# Patient Record
Sex: FEMALE | Race: WHITE | NOT HISPANIC OR LATINO | Employment: FULL TIME | URBAN - METROPOLITAN AREA
[De-identification: names, ages, dates, MRNs, and addresses within clinical notes are randomized per-mention and may not be internally consistent; named-entity substitution may affect disease eponyms.]

---

## 2020-06-18 ENCOUNTER — OFFICE VISIT (OUTPATIENT)
Dept: OBGYN | Facility: CLINIC | Age: 25
End: 2020-06-18
Payer: COMMERCIAL

## 2020-06-18 VITALS — DIASTOLIC BLOOD PRESSURE: 80 MMHG | SYSTOLIC BLOOD PRESSURE: 132 MMHG | HEART RATE: 114 BPM | WEIGHT: 156.7 LBS

## 2020-06-18 DIAGNOSIS — Z30.432 ENCOUNTER FOR IUD REMOVAL: ICD-10-CM

## 2020-06-18 DIAGNOSIS — Z30.433 ENCOUNTER FOR REMOVAL AND REINSERTION OF INTRAUTERINE CONTRACEPTIVE DEVICE (IUD): ICD-10-CM

## 2020-06-18 DIAGNOSIS — Z30.430 ENCOUNTER FOR IUD INSERTION: ICD-10-CM

## 2020-06-18 DIAGNOSIS — Z30.433 ENCOUNTER FOR REMOVAL AND REINSERTION OF INTRAUTERINE CONTRACEPTIVE DEVICE: Primary | ICD-10-CM

## 2020-06-18 LAB — HCG, QUAL URINE: NORMAL

## 2020-06-18 PROCEDURE — 84703 CHORIONIC GONADOTROPIN ASSAY: CPT | Performed by: OBSTETRICS & GYNECOLOGY

## 2020-06-18 PROCEDURE — 58300 INSERT INTRAUTERINE DEVICE: CPT | Performed by: OBSTETRICS & GYNECOLOGY

## 2020-06-18 PROCEDURE — 58301 REMOVE INTRAUTERINE DEVICE: CPT | Performed by: OBSTETRICS & GYNECOLOGY

## 2020-06-18 RX ORDER — SPIRONOLACTONE 100 MG/1
TABLET, FILM COATED ORAL
COMMUNITY
Start: 2020-04-03 | End: 2022-05-09

## 2020-06-18 NOTE — NURSING NOTE
Chief Complaint   Patient presents with     IUD     Remove and insert Salud       Initial /80 (BP Location: Right arm, Patient Position: Chair, Cuff Size: Adult Regular)   Pulse 114   Wt 71.1 kg (156 lb 11.2 oz)   LMP 2020 (Approximate)   Breastfeeding No  There is no height or weight on file to calculate BMI.  BP completed using cuff size: regular    Questioned patient about current smoking habits.  Pt. has never smoked.          The following HM Due: NONE      Pap Dec 2019 = NIL       Marla Gutierrez CMA on 2020 at 3:26 PM

## 2020-06-18 NOTE — PROGRESS NOTES
SUBJECTIVE:    Is a pregnancy test required: Yes.  Was it positive or negative?  Negative  Was a consent obtained?  Yes    Subjective: Rafael Loya is a 24 year old  presents for IUD and desires Salud  type IUD.  She requests removal of the IUD because the IUD effectiveness has  She had her previous Salud IUD inserted in May 2017    Patient has been given the opportunity to ask questions about all forms of birth control, including all options appropriate for Rafael Loya. Discussed that no method of birth control, except abstinence is 100% effective against pregnancy or sexually transmitted infection.     Rafael Loya understands she may have the IUD removed at any time. IUD should be removed by a health care provider and the current IUD will be removed today.    The entire removal and insertion procedure was reviewed with the patient, including care after placement.    Today's PHQ-2 Score: No flowsheet data found.    PROCEDURE:    Premedicated with ibuprofen.  A speculum exam was performed and the cervix was visualized. The IUD string was visualized. Using ring forceps, the string  was grasped and the IUD removed intact.    Under sterile technique, cervix was visualized with speculum and prepped with Betadine solution swab x 3. Tenaculum was placed for stability. The uterus was gently straightened and sounded to 8.0 cm. IUD prepared for placement, and IUD inserted according to 's instructions without difficulty or significant ressitance, and deployed at the fundus. The strings were visualized and trimmed to 2.0 cm from the external os. Tenaculum was removed and hemostasis noted. Speculum removed.  Patient tolerated procedure well.    EBL: minimal    Complications: none      POST PROCEDURE:    Given 's handouts, including when to have IUD removed, list of danger s/sx, side effects and follow up recommended. Encouraged condom use for prevention of STD. Advised  to call for any fever, for prolonged or severe pain or bleeding, abnormal vaginal dischage, or unable to palpate strings. She was advised to use pain medications (ibuprofen) as needed for mild to moderate pain. Advised to follow-up in clinic in 4-6 weeks for IUD string check if unable to find strings or as directed by provider.     Vijaya Godoy MD  [facility]

## 2020-11-22 ENCOUNTER — HEALTH MAINTENANCE LETTER (OUTPATIENT)
Age: 25
End: 2020-11-22

## 2021-09-19 ENCOUNTER — HEALTH MAINTENANCE LETTER (OUTPATIENT)
Age: 26
End: 2021-09-19

## 2022-01-09 ENCOUNTER — HEALTH MAINTENANCE LETTER (OUTPATIENT)
Age: 27
End: 2022-01-09

## 2022-05-03 ASSESSMENT — ENCOUNTER SYMPTOMS
SORE THROAT: 0
FEVER: 0
JOINT SWELLING: 0
MYALGIAS: 0
HEMATOCHEZIA: 0
DYSURIA: 0
ARTHRALGIAS: 0
PARESTHESIAS: 0
WEAKNESS: 0
HEMATURIA: 0
SHORTNESS OF BREATH: 0
FREQUENCY: 0
CONSTIPATION: 0
PALPITATIONS: 0
NERVOUS/ANXIOUS: 1
EYE PAIN: 0
DIZZINESS: 0
HEADACHES: 0
NAUSEA: 0
COUGH: 0
CHILLS: 0
HEARTBURN: 0
BREAST MASS: 0
ABDOMINAL PAIN: 0
DIARRHEA: 0

## 2022-05-09 ENCOUNTER — OFFICE VISIT (OUTPATIENT)
Dept: FAMILY MEDICINE | Facility: CLINIC | Age: 27
End: 2022-05-09
Payer: COMMERCIAL

## 2022-05-09 VITALS
OXYGEN SATURATION: 100 % | WEIGHT: 187 LBS | DIASTOLIC BLOOD PRESSURE: 64 MMHG | HEART RATE: 132 BPM | TEMPERATURE: 97.5 F | SYSTOLIC BLOOD PRESSURE: 138 MMHG

## 2022-05-09 DIAGNOSIS — Z00.00 ROUTINE PHYSICAL EXAMINATION: Primary | ICD-10-CM

## 2022-05-09 DIAGNOSIS — Z12.4 CERVICAL CANCER SCREENING: ICD-10-CM

## 2022-05-09 PROBLEM — F32.A DEPRESSIVE DISORDER: Status: ACTIVE | Noted: 2022-05-09

## 2022-05-09 PROBLEM — I10 HYPERTENSION: Status: ACTIVE | Noted: 2022-05-09

## 2022-05-09 PROCEDURE — 99385 PREV VISIT NEW AGE 18-39: CPT | Performed by: NURSE PRACTITIONER

## 2022-05-09 PROCEDURE — G0145 SCR C/V CYTO,THINLAYER,RESCR: HCPCS | Performed by: NURSE PRACTITIONER

## 2022-05-09 ASSESSMENT — ENCOUNTER SYMPTOMS
PARESTHESIAS: 0
MYALGIAS: 0
WEAKNESS: 0
ARTHRALGIAS: 0
EYE PAIN: 0
ABDOMINAL PAIN: 0
HEADACHES: 0
SORE THROAT: 0
JOINT SWELLING: 0
BREAST MASS: 0
DYSURIA: 0
FREQUENCY: 0
CHILLS: 0
CONSTIPATION: 0
HEARTBURN: 0
COUGH: 0
DIZZINESS: 0
DIARRHEA: 0
SHORTNESS OF BREATH: 0
NERVOUS/ANXIOUS: 1
HEMATURIA: 0
FEVER: 0
NAUSEA: 0
HEMATOCHEZIA: 0
PALPITATIONS: 0

## 2022-05-09 NOTE — PROGRESS NOTES
SUBJECTIVE:   CC: Rafael Loya is an 26 year old woman who presents for preventive health visit.        Healthy Habits:     Getting at least 3 servings of Calcium per day:  Yes    Bi-annual eye exam:  NO    Dental care twice a year:  NO    Sleep apnea or symptoms of sleep apnea:  Daytime drowsiness    Diet:  Regular (no restrictions) and Breakfast skipped    Frequency of exercise:  2-3 days/week    Duration of exercise:  15-30 minutes    Taking medications regularly:  Yes    Medication side effects:  Not applicable    PHQ-2 Total Score: 2     -Skin area on her back - not painful - not red. Noticed in January. Seemed like a pimple that might come to the surface but never did.     Today's PHQ-2 Score:   PHQ-2 ( 1999 Pfizer) 5/3/2022   Q1: Little interest or pleasure in doing things 1   Q2: Feeling down, depressed or hopeless 1   PHQ-2 Score 2   Q1: Little interest or pleasure in doing things Several days   Q2: Feeling down, depressed or hopeless Several days   PHQ-2 Score 2       Abuse: Current or Past (Physical, Sexual or Emotional) - No  Do you feel safe in your environment? Yes    Have you ever done Advance Care Planning? (For example, a Health Directive, POLST, or a discussion with a medical provider or your loved ones about your wishes):     Social History     Tobacco Use     Smoking status: Never Smoker     Smokeless tobacco: Never Used   Substance Use Topics     Alcohol use: Yes     Comment: socailly       Alcohol Use 5/3/2022   Prescreen: >3 drinks/day or >7 drinks/week? No       Reviewed orders with patient.  Reviewed health maintenance and updated orders accordingly -   Lab work is in process    Breast Cancer Screening:    Breast CA Risk Assessment (FHS-7) 5/3/2022   Do you have a family history of breast, colon, or ovarian cancer? No / Unknown         Patient under 40 years of age: Routine Mammogram Screening not recommended.   Pertinent mammograms are reviewed under the imaging tab.    History of  abnormal Pap smear: NO - age 21-29 PAP every 3 years recommended     Reviewed and updated as needed this visit by clinical staff   Tobacco  Allergies  Meds   Med Hx   Fam Hx            Reviewed and updated as needed this visit by Provider       Med Hx   Fam Hx           Past Medical History:   Diagnosis Date     Depressive disorder August 2018    Mild depression/moderate anxiety. Took sertraline until about a year ago     Hypertension     I have measured high a few times, nothing diagnosed but have family history of high blood pressure      Past Surgical History:   Procedure Laterality Date     ORTHOPEDIC SURGERY      Broken arm age 14       Review of Systems   Constitutional: Negative for chills and fever.   HENT: Negative for congestion, ear pain, hearing loss and sore throat.    Eyes: Negative for pain and visual disturbance.   Respiratory: Negative for cough and shortness of breath.    Cardiovascular: Negative for chest pain, palpitations and peripheral edema.   Gastrointestinal: Negative for abdominal pain, constipation, diarrhea, heartburn, hematochezia and nausea.   Breasts:  Negative for tenderness, breast mass and discharge.   Genitourinary: Negative for dysuria, frequency, genital sores, hematuria, pelvic pain, urgency, vaginal bleeding and vaginal discharge.   Musculoskeletal: Negative for arthralgias, joint swelling and myalgias.   Skin: Negative for rash.   Neurological: Negative for dizziness, weakness, headaches and paresthesias.   Psychiatric/Behavioral: Negative for mood changes. The patient is nervous/anxious.              OBJECTIVE:   /64 (BP Location: Left arm, Cuff Size: Adult Regular)   Pulse (!) 132   Temp 97.5  F (36.4  C) (Tympanic)   Wt 84.8 kg (187 lb)   SpO2 100%   Physical Exam  GENERAL: healthy, alert and no distress  EYES: Eyes grossly normal to inspection, PERRL and conjunctivae and sclerae normal  HENT: ear canals and TM's normal, nose and mouth without ulcers or  lesions  NECK: no adenopathy, no asymmetry, masses, or scars and thyroid normal to palpation  RESP: lungs clear to auscultation - no rales, rhonchi or wheezes  BREAST: normal without masses, tenderness or nipple discharge and no palpable axillary masses or adenopathy  CV: regular rate and rhythm, normal S1 S2, no S3 or S4, no murmur, click or rub, no peripheral edema and peripheral pulses strong  ABDOMEN: soft, nontender, no hepatosplenomegaly, no masses and bowel sounds normal   (female): normal female external genitalia, normal urethral meatus, vaginal mucosa pink, moist, well rugated, and normal cervix/adnexa/uterus without masses or discharge  MS: no gross musculoskeletal defects noted, no edema  SKIN:small 1 cm cyst mid upper back, nontender.  NEURO: Normal strength and tone, mentation intact and speech normal  PSYCH: mentation appears normal, affect normal/bright    Diagnostic Test Results:  Labs reviewed in Epic    ASSESSMENT/PLAN:   Rafael was seen today for physical.    Diagnoses and all orders for this visit:    Routine physical examination    Cervical cancer screening  -     Pap Screen reflex to HPV if ASCUS - recommend age 25 - 29    Other orders  -     REVIEW OF HEALTH MAINTENANCE PROTOCOL ORDERS            COUNSELING:  Reviewed preventive health counseling, as reflected in patient instructions    There is no height or weight on file to calculate BMI.    Weight management plan: Discussed healthy diet and exercise guidelines    She reports that she has never smoked. She has never used smokeless tobacco.      Counseling Resources:  ATP IV Guidelines  Pooled Cohorts Equation Calculator  Breast Cancer Risk Calculator  BRCA-Related Cancer Risk Assessment: FHS-7 Tool  FRAX Risk Assessment  ICSI Preventive Guidelines  Dietary Guidelines for Americans, 2010  USDA's MyPlate  ASA Prophylaxis  Lung CA Screening    Whitney Florentino, [facility]

## 2022-05-13 LAB
BKR LAB AP GYN ADEQUACY: NORMAL
BKR LAB AP GYN INTERPRETATION: NORMAL
BKR LAB AP HPV REFLEX: NORMAL
BKR LAB AP PREVIOUS ABNORMAL: NORMAL
PATH REPORT.COMMENTS IMP SPEC: NORMAL
PATH REPORT.COMMENTS IMP SPEC: NORMAL
PATH REPORT.RELEVANT HX SPEC: NORMAL

## 2022-11-21 ENCOUNTER — HEALTH MAINTENANCE LETTER (OUTPATIENT)
Age: 27
End: 2022-11-21

## 2023-07-08 ENCOUNTER — HEALTH MAINTENANCE LETTER (OUTPATIENT)
Age: 28
End: 2023-07-08

## 2023-08-13 NOTE — PROGRESS NOTES
SUBJECTIVE:    Is a pregnancy test required: No.  Was a consent obtained?  Yes    Subjective: Rafael Loya is a 27 year old  presents for IUD and desires hormone type IUD.  She requests removal of the IUD because the IUD effectiveness has     Patient has been given the opportunity to ask questions about all forms of birth control, including all options appropriate for Rafael Loya. Discussed that no method of birth control, except abstinence is 100% effective against pregnancy or sexually transmitted infection.     Rafael Loya understands she may have the IUD removed at any time. IUD should be removed by a health care provider and the current IUD will be removed today.    The entire removal and insertion procedure was reviewed with the patient, including care after placement.    Today's PHQ-2 Score:       5/3/2022    12:36 PM   PHQ-2 (  [employer])   Q1: Little interest or pleasure in doing things 1   Q2: Feeling down, depressed or hopeless 1   PHQ-2 Score 2   Q1: Little interest or pleasure in doing things Several days   Q2: Feeling down, depressed or hopeless Several days   PHQ-2 Score 2       PROCEDURE:    Premedicated with ibuprofen.  A speculum exam was performed and the cervix was visualized. The IUD string was visualized. Using ring forceps, the string  was grasped and the IUD removed intact.    Under sterile technique, cervix was visualized with speculum and prepped with Betadine solution swab x 3. The uterus was gently straightened and sounded to 8.0 cm. IUD prepared for placement, and IUD inserted according to 's instructions without difficulty or significant ressitance, and deployed at the fundus. The strings were visualized and trimmed to 3.0 cm from the external os. Tenaculum was removed and hemostasis noted. Speculum removed.  Patient tolerated procedure well.    Lot # [id number]  Exp: 2024    EBL: minimal    Complications: none      POST  PROCEDURE:    Given 's handouts, including when to have IUD removed, list of danger s/sx, side effects and follow up recommended. Encouraged condom use for prevention of STD. Advised to call for any fever, for prolonged or severe pain or bleeding, abnormal vaginal dischage, or unable to palpate strings. She was advised to use pain medications (ibuprofen) as needed for mild to moderate pain. Advised to follow-up in clinic in 4-6 weeks for IUD string check if unable to find strings or as directed by provider.     ELIAZAR Hernandez CNM

## 2023-08-14 ENCOUNTER — OFFICE VISIT (OUTPATIENT)
Dept: MIDWIFE SERVICES | Facility: CLINIC | Age: 28
End: 2023-08-14
Payer: COMMERCIAL

## 2023-08-14 VITALS
WEIGHT: 179 LBS | DIASTOLIC BLOOD PRESSURE: 60 MMHG | SYSTOLIC BLOOD PRESSURE: 125 MMHG | BODY MASS INDEX: 28.77 KG/M2 | HEIGHT: 66 IN

## 2023-08-14 DIAGNOSIS — Z30.433 ENCOUNTER FOR REMOVAL AND REINSERTION OF INTRAUTERINE CONTRACEPTIVE DEVICE: Primary | ICD-10-CM

## 2023-08-14 PROCEDURE — 58301 REMOVE INTRAUTERINE DEVICE: CPT | Performed by: ADVANCED PRACTICE MIDWIFE

## 2023-08-14 PROCEDURE — 58300 INSERT INTRAUTERINE DEVICE: CPT | Performed by: ADVANCED PRACTICE MIDWIFE

## 2023-08-14 ASSESSMENT — ANXIETY QUESTIONNAIRES
6. BECOMING EASILY ANNOYED OR IRRITABLE: SEVERAL DAYS
IF YOU CHECKED OFF ANY PROBLEMS ON THIS QUESTIONNAIRE, HOW DIFFICULT HAVE THESE PROBLEMS MADE IT FOR YOU TO DO YOUR WORK, TAKE CARE OF THINGS AT HOME, OR GET ALONG WITH OTHER PEOPLE: SOMEWHAT DIFFICULT
5. BEING SO RESTLESS THAT IT IS HARD TO SIT STILL: NOT AT ALL
3. WORRYING TOO MUCH ABOUT DIFFERENT THINGS: SEVERAL DAYS
1. FEELING NERVOUS, ANXIOUS, OR ON EDGE: SEVERAL DAYS
7. FEELING AFRAID AS IF SOMETHING AWFUL MIGHT HAPPEN: SEVERAL DAYS
2. NOT BEING ABLE TO STOP OR CONTROL WORRYING: SEVERAL DAYS
GAD7 TOTAL SCORE: 6
GAD7 TOTAL SCORE: 6

## 2023-08-14 ASSESSMENT — PATIENT HEALTH QUESTIONNAIRE - PHQ9
5. POOR APPETITE OR OVEREATING: SEVERAL DAYS
SUM OF ALL RESPONSES TO PHQ QUESTIONS 1-9: 2

## 2024-08-31 ENCOUNTER — HEALTH MAINTENANCE LETTER (OUTPATIENT)
Age: 29
End: 2024-08-31

## 2025-04-22 PROBLEM — K80.20 GALLSTONES: Status: ACTIVE | Noted: 2023-01-15

## 2025-04-22 PROBLEM — Z00.00 ENCOUNTER FOR PREVENTIVE HEALTH EXAMINATION: Status: ACTIVE | Noted: 2025-04-22

## 2025-04-22 PROBLEM — L70.8 OTHER ACNE: Status: ACTIVE | Noted: 2019-12-02

## 2025-04-22 PROBLEM — F41.9 ANXIETY: Status: ACTIVE | Noted: 2019-12-02

## 2025-04-22 PROBLEM — I34.1 MITRAL PROLAPSE: Status: ACTIVE | Noted: 2022-12-06

## 2025-04-23 ENCOUNTER — OFFICE VISIT (OUTPATIENT)
Dept: OBGYN | Facility: CLINIC | Age: 30
End: 2025-04-23
Attending: ADVANCED PRACTICE MIDWIFE
Payer: COMMERCIAL

## 2025-04-23 VITALS
BODY MASS INDEX: 30.81 KG/M2 | WEIGHT: 190.9 LBS | SYSTOLIC BLOOD PRESSURE: 120 MMHG | DIASTOLIC BLOOD PRESSURE: 82 MMHG | HEART RATE: 102 BPM

## 2025-04-23 DIAGNOSIS — I10 PRIMARY HYPERTENSION: ICD-10-CM

## 2025-04-23 DIAGNOSIS — Z00.00 ENCOUNTER FOR PREVENTIVE HEALTH EXAMINATION: Primary | ICD-10-CM

## 2025-04-23 DIAGNOSIS — R03.0 ELEVATED BLOOD PRESSURE READING IN OFFICE WITHOUT DIAGNOSIS OF HYPERTENSION: ICD-10-CM

## 2025-04-23 DIAGNOSIS — F41.8 DEPRESSION WITH ANXIETY: ICD-10-CM

## 2025-04-23 DIAGNOSIS — Z30.432 ENCOUNTER FOR IUD REMOVAL: ICD-10-CM

## 2025-04-23 PROCEDURE — 87624 HPV HI-RISK TYP POOLED RSLT: CPT | Performed by: ADVANCED PRACTICE MIDWIFE

## 2025-04-23 RX ORDER — CYANOCOBALAMIN (VITAMIN B-12) 500 MCG
1 TABLET ORAL DAILY
Qty: 90 CAPSULE | Refills: 3 | Status: SHIPPED | OUTPATIENT
Start: 2025-04-23

## 2025-04-23 NOTE — PATIENT INSTRUCTIONS
Thank you for trusting us with your care!   Please be aware, if you are on Mychart, you may see your results prior to your providers review. If labs are abnormal, we will call or message you on [employer] with a follow up plan.    If you need to contact us for questions about:  Symptoms, Scheduling & Medical Questions; Non-urgent (2-3 day response) [employer] message, Urgent (needing response today) [phone] (if after 3:30pm next day response)   Prescriptions: Please call your Pharmacy   Billing: Leia [phone] or [facility]:[phone]

## 2025-04-23 NOTE — LETTER
2025       RE: Rafael Loya   [address]  [place] [facility] [address]     Dear Colleague,    Thank you for referring your patient, Rafael Loya, to the [facility] [place] [facility]. Please see a copy of my visit note below.      Progress Note    SUBJECTIVE:  Rafael Loya is an 29 year old, , who requests an Annual Preventive Exam.   She is a new patient to the [facility].   LMP spotting with Salud previously had normal monthly cycles prior to IUD use  Currently sexually active with one male identifying partner x 10 years, Cliff, they are . They are monogamous, declines STD screening today  Currently using Salud for birth control, inserted 2023 due for removal    Desires removal today as they would like to get pregnant this .     The patient reports that there is not domestic violence in her life.      Denies abnormal vaginal discharge, itching, irritation, odor, dyspareunia, or dysuria.    Exercise: walk the dog, strength training [employer] classes twice a week  Stress reduction: exercise, walking her mendoza retriever, reading  Nutrition/Calcium intake: well balanced, worse during tax season  Number of alcoholic drinks/week: 3 drinks/twice a week, not concerned, no nicotine, no other substances  Ideal weight is 160, currently 190lbs  Family hx of Breast, ovarian, colon cancer: no family history    Last pap: 2022 NILM due for a pap today  Last mammogram: NA  Last colonoscopy: NA      Menstrual History:      2020     3:15 PM 2023     2:20 PM 2023     2:48 PM   Menstrual History   LAST MENSTRUAL PERIOD 2020    Menarche Age   12 years   Period Cycle (Days)   0   Period Duration (Days)   0   Method of Contraception   Other (see comments)   Period Pattern   Regular   Menstrual Control   Other (Comment)   Menstrual Control Change Freq  "(Hours)   Pt has IUD   Reviewed Today   Yes   Comments   Pt has Intrauteine Salud Device       Last  No results found for: \"PAP\"  History of abnormal Pap smear: No - age 30-64 HPV with reflex Pap every 5 years recommended    Last No results found for: \"HPV16\"  Last No results found for: \"HPV18\"  Last No results found for: \"HRHPV\"    Mammogram current: not applicable  Last Mammogram:   No results found.     Last Colonoscopy:  No results found for this or any previous visit.      HISTORY:  Current Outpatient Medications   Medication Sig Dispense Refill     Prenatal Vit-Fe Sulfate-FA-DHA (ULTRA PRENATAL VIT/MIN + DHA) 23-0.8-200 MG CAPS Take 1 tablet by mouth daily. 90 capsule 3     sertraline (ZOLOFT) 50 MG tablet Take 1 tablet (50 mg) by mouth daily. 90 tablet 3     No current facility-administered medications for this visit.     Allergies   Allergen Reactions     Penicillins Rash     -From [employer]     Immunization History   Administered Date(s) Administered     COVID-19 12+ (MODERNA) 11/17/2023     COVID-19 12+ (Pfizer) 11/20/2024     COVID-19 Bivalent 12+ (Pfizer) 12/06/2022     COVID-19 MONOVALENT 12+ (Pfizer) 04/16/2021, 05/07/2021, 12/29/2021     DTAP (<7y) 1994, 02/07/1995, 04/05/1995, 04/15/1996, 10/29/1999     HIB, Unspecified 1994, 02/07/1995, 04/05/1995, 10/13/1995     HPV9 (Gardasil) 02/18/2017, 05/15/2017, 08/06/2018     HepB, Unspecified 1994, 1994, 04/05/1995     Hepatitis A (VAQTA)(ADULT 19+) 08/06/2018, 12/02/2019     Influenza (H1N1) 12/18/2009     Influenza Vaccine (Flucelvax Quadrivalent) 10/06/2021, 09/22/2022, 09/20/2023     Influenza Vaccine, 6+MO IM (QUADRIVALENT W/PRESERVATIVES) 10/02/2018, 10/03/2019, 09/17/2020     Influenza, Split Virus, Trivalent, Pf (Fluzone\Fluarix) 10/12/2016, 11/20/2024     MMR (MMRII) 10/13/1995, 10/29/1999     Meningococcal ACWY (Menactra ) 06/27/2007, 08/06/2018     Polio, Unspecified 1994, 02/07/1995, 04/05/1995, 10/29/1999     TD,PF " 7+ (Tenivac) 2018     TDAP (Adacel,Boostrix) 2007     Varicella (Varivax) 10/13/1995, 2007       OB History    Para Term  AB Living   0 0 0 0 0 0   SAB IAB Ectopic Multiple Live Births   0 0 0 0 0     Past Medical History:   Diagnosis Date     Depressive disorder 2018    Mild depression/moderate anxiety. Took sertraline until about a year ago     Hypertension     I have measured high a few times, nothing diagnosed but have family history of high blood pressure     Past Surgical History:   Procedure Laterality Date     ORTHOPEDIC SURGERY      Broken arm age 14     Family History   Problem Relation Age of Onset     Hypertension Father      Other Cancer Maternal Grandfather         Lung cancer     Hypertension Paternal Grandmother      Prostate Cancer Paternal Grandfather      Breast Cancer No family hx of      Colon Cancer No family hx of      Ovarian Cancer No family hx of      Diabetes No family hx of      Social History     Socioeconomic History     Marital status:    Tobacco Use     Smoking status: Never     Smokeless tobacco: Never   Substance and Sexual Activity     Alcohol use: Yes     Comment: socailly     Drug use: Never     Sexual activity: Yes     Partners: Male     Birth control/protection: Condom, I.U.D.   Other Topics Concern     Parent/sibling w/ CABG, MI or angioplasty before 65F 55M? No     Social Drivers of Health      Received from [facility] [employer]    Social Connections       ROS  [unfilled]      2023     2:47 PM   PHQ-9 SCORE   PHQ-9 Total Score 2         2023     2:47 PM   VARGAS-7 SCORE   Total Score 6         EXAM:  Blood pressure 120/82, pulse 102, weight 86.6 kg (190 lb 14.4 oz), not currently breastfeeding. Body mass index is 30.81 kg/m .     BP Readings from Last 3 Encounters:   25 120/82   23 125/60   22 138/64      General - pleasant female in no acute distress.  Skin - no suspicious lesions or  rashes  EENT-  PERRLA, euthyroid with out palpable nodules  Neck - supple without lymphadenopathy.  Lungs - clear to auscultation bilaterally.  Heart - regular rate and rhythm without murmur.  Abdomen - soft, nontender, nondistended, no masses or organomegaly noted.  Musculoskeletal - no gross deformities.  Neurological - normal strength, sensation, and mental status.    Breast Exam:  Breast: Without visible skin changes. No dimpling or lesions seen.   Breasts supple, non-tender with palpation, no dominant mass, nodularity, or nipple discharge noted bilaterally. Axillary nodes negative.      Pelvic Exam:  EG/BUS: Normal genital architecture without lesions, erythema or abnormal secretions Bartholin's, Urethra, Minburn's normal   Urethral meatus: normal   Urethra: no masses, tenderness, or scarring   Bladder: no masses or tenderness   Vagina: moist, pink, rugae with creamy, white, and odorless  secretions  Cervix: no lesions, pink, moist, closed, without lesion or CMT, and IUD strings extend 2 cm from external os.pap collected  Uterus: retroverted,   Adnexa: Within normal limits and No masses, nodularity, tenderness  Rectum:anus normal       ASSESSMENT:  Encounter Diagnoses   Name Primary?     Encounter for preventive health examination Yes     Primary hypertension      Elevated blood pressure reading in office without diagnosis of hypertension      Encounter for IUD removal      Depression with anxiety         PLAN:   Orders Placed This Encounter   Procedures     Obtaining, preparing and conveyance of cervical or vaginal smear to laboratory.     HPV and Gynecologic Cytology Panel - Recommended Age 30 - 65 Years     Home Blood Pressure Monitor Order for DME - ONLY FOR DME     Gynecologic Cytology (PAP)     Orders Placed This Encounter   Medications     DISCONTD: sertraline (ZOLOFT) 50 MG tablet     Sig: Take 50 mg by mouth.     Prenatal Vit-Fe Sulfate-FA-DHA (ULTRA PRENATAL VIT/MIN + DHA) 23-0.8-200 MG CAPS     Sig:  Take 1 tablet by mouth daily.     Dispense:  90 capsule     Refill:  3     sertraline (ZOLOFT) 50 MG tablet     Sig: Take 1 tablet (50 mg) by mouth daily.     Dispense:  90 tablet     Refill:  3     -Elevated blood pressure today in clinic. Rx of blood pressure cuff sent to [facility]. Encouraged Tory to take her blood pressure at home a few times a week and schedule a visit in three months with Dr. Fairbanks for follow up  -Refill Zoloft   -Pre-conception counseling  -Reviewed signs of ovulation, optimal timing of intercourse, sperm-friendly lubrication.   -Encouraged trying to achieve optimal health/weight prior to conceiving, encouraged use a prenatal vitamin.  -Recommended returning in 6 months if she has not conceived for further discussion/workup  -Reviewed option to track cycles with guide from [facility] Your [facility] ([employer])  -prenatal vitamins Sent to pharmacy of choice.    Return to clinic in one year.  Follow-up as needed.      Ximena Bejarano CNM      IUD Removal:  SUBJECTIVE:    Is a pregnancy test required: No.  Was a consent obtained?  Yes    Rafael Loya is a 29 year old female,, No LMP recorded. (Menstrual status: IUD). who presents today for IUD removal. Her current IUD was placed . She has not had problems with the IUD. She requests removal of the IUD because she desires to conceive    Today's PHQ-2 Score:       2025     4:20 PM   PHQ-2 (  [employer])   Q1: Little interest or pleasure in doing things 0   Q2: Feeling down, depressed or hopeless 0   PHQ-2 Score 0    Q1: Little interest or pleasure in doing things Not at all   Q2: Feeling down, depressed or hopeless Not at all   PHQ-2 Score 0       Patient-reported         PROCEDURE:    A speculum exam was performed and the cervix was visualized. The IUD string was visualized. Using ring forceps, the string  was grasped and the IUD removed intact.    POST PROCEDURE:    The patient tolerated the  procedure well. Patient was discharged in stable condition.    Call if bleeding, pain or fever occur. and Birth control counseling given.    Ximena Bejarano CNM      Again, thank you for allowing me to participate in the care of your patient.      Sincerely,    Ximena Bejarano CNM

## 2025-04-23 NOTE — PROGRESS NOTES
"  Progress Note    SUBJECTIVE:  Rafael Loya is an 29 year old, , who requests an Annual Preventive Exam.   She is a new patient to the [facility].   LMP spotting with Salud previously had normal monthly cycles prior to IUD use  Currently sexually active with one male identifying partner x 10 years, Cliff, they are . They are monogamous, declines STD screening today  Currently using Salud for birth control, inserted 2023 due for removal    Desires removal today as they would like to get pregnant this fall.     The patient reports that there is not domestic violence in her life.      Denies abnormal vaginal discharge, itching, irritation, odor, dyspareunia, or dysuria.    Exercise: walk the dog, strength training [employer] classes twice a week  Stress reduction: exercise, walking her mendoza retriever, reading  Nutrition/Calcium intake: well balanced, worse during tax season  Number of alcoholic drinks/week: 3 drinks/twice a week, not concerned, no nicotine, no other substances  Ideal weight is 160, currently 190lbs  Family hx of Breast, ovarian, colon cancer: no family history    Last pap: 2022 NILM due for a pap today  Last mammogram: NA  Last colonoscopy: NA      Menstrual History:      2020     3:15 PM 2023     2:20 PM 2023     2:48 PM   Menstrual History   LAST MENSTRUAL PERIOD 2020    Menarche Age   12 years   Period Cycle (Days)   0   Period Duration (Days)   0   Method of Contraception   Other (see comments)   Period Pattern   Regular   Menstrual Control   Other (Comment)   Menstrual Control Change Freq (Hours)   Pt has IUD   Reviewed Today   Yes   Comments   Pt has Intrauteine Salud Device       Last  No results found for: \"PAP\"  History of abnormal Pap smear: No - age 30-64 HPV with reflex Pap every 5 years recommended    Last No results found for: \"HPV16\"  Last No results found for: \"HPV18\"  Last No results found for: " "\"HRHPV\"    Mammogram current: not applicable  Last Mammogram:   No results found.     Last Colonoscopy:  No results found for this or any previous visit.      HISTORY:  Current Outpatient Medications   Medication Sig Dispense Refill    Prenatal Vit-Fe Sulfate-FA-DHA (ULTRA PRENATAL VIT/MIN + DHA) 23-0.8-200 MG CAPS Take 1 tablet by mouth daily. 90 capsule 3    sertraline (ZOLOFT) 50 MG tablet Take 1 tablet (50 mg) by mouth daily. 90 tablet 3     No current facility-administered medications for this visit.     Allergies   Allergen Reactions    Penicillins Rash     -From [facility]     Immunization History   Administered Date(s) Administered    COVID-19 12+ (MODERNA) 2023    COVID-19 12+ (Pfizer) 2024    COVID-19 Bivalent 12+ (Pfizer) 2022    COVID-19 MONOVALENT 12+ (Pfizer) 2021, 2021, 2021    DTAP (<7y) 1994, 1995, 1995, 04/15/1996, 10/29/1999    HIB, Unspecified 1994, 1995, 1995, 10/13/1995    HPV9 (Gardasil) 2017, 05/15/2017, 2018    HepB, Unspecified 1994, 1994, 1995    Hepatitis A (VAQTA)(ADULT 19+) 2018, 2019    Influenza (H1N1) 2009    Influenza Vaccine (Flucelvax Quadrivalent) 10/06/2021, 2022, 2023    Influenza Vaccine, 6+MO IM (QUADRIVALENT W/PRESERVATIVES) 10/02/2018, 10/03/2019, 2020    Influenza, Split Virus, Trivalent, Pf (Fluzone\Fluarix) 10/12/2016, 2024    MMR (MMRII) 10/13/1995, 10/29/1999    Meningococcal ACWY (Menactra ) 2007, 2018    Polio, Unspecified 1994, 1995, 1995, 10/29/1999    TD,PF 7+ (Tenivac) 2018    TDAP (Adacel,Boostrix) 2007    Varicella (Varivax) 10/13/1995, 2007       OB History    Para Term  AB Living   0 0 0 0 0 0   SAB IAB Ectopic Multiple Live Births   0 0 0 0 0     Past Medical History:   Diagnosis Date    Depressive disorder 2018    Mild depression/moderate anxiety. " Took sertraline until about a year ago    Hypertension     I have measured high a few times, nothing diagnosed but have family history of high blood pressure     Past Surgical History:   Procedure Laterality Date    ORTHOPEDIC SURGERY      Broken arm age 14     Family History   Problem Relation Age of Onset    Hypertension Father     Other Cancer Maternal Grandfather         Lung cancer    Hypertension Paternal Grandmother     Prostate Cancer Paternal Grandfather     Breast Cancer No family hx of     Colon Cancer No family hx of     Ovarian Cancer No family hx of     Diabetes No family hx of      Social History     Socioeconomic History    Marital status:    Tobacco Use    Smoking status: Never    Smokeless tobacco: Never   Substance and Sexual Activity    Alcohol use: Yes     Comment: socailly    Drug use: Never    Sexual activity: Yes     Partners: Male     Birth control/protection: Condom, I.U.D.   Other Topics Concern    Parent/sibling w/ CABG, MI or angioplasty before 65F 55M? No     Social Drivers of Health      Received from [employer]    Social Connections       ROS  [unfilled]      8/14/2023     2:47 PM   PHQ-9 SCORE   PHQ-9 Total Score 2         8/14/2023     2:47 PM   VARGAS-7 SCORE   Total Score 6         EXAM:  Blood pressure 120/82, pulse 102, weight 86.6 kg (190 lb 14.4 oz), not currently breastfeeding. Body mass index is 30.81 kg/m .     BP Readings from Last 3 Encounters:   04/23/25 120/82   08/14/23 125/60   05/09/22 138/64      General - pleasant female in no acute distress.  Skin - no suspicious lesions or rashes  EENT-  PERRLA, euthyroid with out palpable nodules  Neck - supple without lymphadenopathy.  Lungs - clear to auscultation bilaterally.  Heart - regular rate and rhythm without murmur.  Abdomen - soft, nontender, nondistended, no masses or organomegaly noted.  Musculoskeletal - no gross deformities.  Neurological - normal strength, sensation, and mental  status.    Breast Exam:  Breast: Without visible skin changes. No dimpling or lesions seen.   Breasts supple, non-tender with palpation, no dominant mass, nodularity, or nipple discharge noted bilaterally. Axillary nodes negative.      Pelvic Exam:  EG/BUS: Normal genital architecture without lesions, erythema or abnormal secretions Bartholin's, Urethra, [place]'s normal   Urethral meatus: normal   Urethra: no masses, tenderness, or scarring   Bladder: no masses or tenderness   Vagina: moist, pink, rugae with creamy, white, and odorless  secretions  Cervix: no lesions, pink, moist, closed, without lesion or CMT, and IUD strings extend 2 cm from external os.pap collected  Uterus: retroverted,   Adnexa: Within normal limits and No masses, nodularity, tenderness  Rectum:anus normal       ASSESSMENT:  Encounter Diagnoses   Name Primary?    Encounter for preventive health examination Yes    Primary hypertension     Elevated blood pressure reading in office without diagnosis of hypertension     Encounter for IUD removal     Depression with anxiety         PLAN:   Orders Placed This Encounter   Procedures    Obtaining, preparing and conveyance of cervical or vaginal smear to laboratory.    HPV and Gynecologic Cytology Panel - Recommended Age 30 - 65 Years    Home Blood Pressure Monitor Order for DME - ONLY FOR DME    Gynecologic Cytology (PAP)     Orders Placed This Encounter   Medications    DISCONTD: sertraline (ZOLOFT) 50 MG tablet     Sig: Take 50 mg by mouth.    Prenatal Vit-Fe Sulfate-FA-DHA (ULTRA PRENATAL VIT/MIN + DHA) 23-0.8-200 MG CAPS     Sig: Take 1 tablet by mouth daily.     Dispense:  90 capsule     Refill:  3    sertraline (ZOLOFT) 50 MG tablet     Sig: Take 1 tablet (50 mg) by mouth daily.     Dispense:  90 tablet     Refill:  3     -Elevated blood pressure today in clinic. Rx of blood pressure cuff sent to [facility]. Encouraged Tory to take her blood pressure at home a few times a week and  schedule a visit in three months with Dr. Fairbanks for follow up  -Refill Zoloft   -Pre-conception counseling  -Reviewed signs of ovulation, optimal timing of intercourse, sperm-friendly lubrication.   -Encouraged trying to achieve optimal health/weight prior to conceiving, encouraged use a prenatal vitamin.  -Recommended returning in 6 months if she has not conceived for further discussion/workup  -Reviewed option to track cycles with guide from [facility] ([employer])  -prenatal vitamins Sent to pharmacy of choice.    Return to clinic in one year.  Follow-up as needed.      Ximena Bejarano CNM      IUD Removal:  SUBJECTIVE:    Is a pregnancy test required: No.  Was a consent obtained?  Yes    Rafael Loya is a 29 year old female,, No LMP recorded. (Menstrual status: IUD). who presents today for IUD removal. Her current IUD was placed . She has not had problems with the IUD. She requests removal of the IUD because she desires to conceive    Today's PHQ-2 Score:       2025     4:20 PM   PHQ-2 (  [employer])   Q1: Little interest or pleasure in doing things 0   Q2: Feeling down, depressed or hopeless 0   PHQ-2 Score 0    Q1: Little interest or pleasure in doing things Not at all   Q2: Feeling down, depressed or hopeless Not at all   PHQ-2 Score 0       Patient-reported         PROCEDURE:    A speculum exam was performed and the cervix was visualized. The IUD string was visualized. Using ring forceps, the string  was grasped and the IUD removed intact.    POST PROCEDURE:    The patient tolerated the procedure well. Patient was discharged in stable condition.    Call if bleeding, pain or fever occur. and Birth control counseling given.    Ximena Bejarano CNM

## 2025-04-24 LAB
HPV HR 12 DNA CVX QL NAA+PROBE: NEGATIVE
HPV16 DNA CVX QL NAA+PROBE: NEGATIVE
HPV18 DNA CVX QL NAA+PROBE: NEGATIVE
HUMAN PAPILLOMA VIRUS FINAL DIAGNOSIS: NORMAL

## 2025-04-28 LAB
BKR AP ASSOCIATED HPV REPORT: NORMAL
BKR LAB AP GYN ADEQUACY: NORMAL
BKR LAB AP GYN INTERPRETATION: NORMAL
BKR LAB AP PREVIOUS ABNORMAL: NORMAL
PATH REPORT.COMMENTS IMP SPEC: NORMAL
PATH REPORT.COMMENTS IMP SPEC: NORMAL
PATH REPORT.RELEVANT HX SPEC: NORMAL

## 2025-07-22 ENCOUNTER — OFFICE VISIT (OUTPATIENT)
Dept: FAMILY MEDICINE | Facility: CLINIC | Age: 30
End: 2025-07-22
Attending: FAMILY MEDICINE
Payer: COMMERCIAL

## 2025-07-22 VITALS
SYSTOLIC BLOOD PRESSURE: 120 MMHG | BODY MASS INDEX: 30.81 KG/M2 | HEIGHT: 66 IN | DIASTOLIC BLOOD PRESSURE: 79 MMHG | HEART RATE: 88 BPM

## 2025-07-22 DIAGNOSIS — R03.0 WHITE COAT SYNDROME WITHOUT HYPERTENSION: Primary | ICD-10-CM

## 2025-07-22 PROBLEM — Z30.430 ENCOUNTER FOR IUD INSERTION: Status: RESOLVED | Noted: 2020-06-18 | Resolved: 2025-07-22

## 2025-07-22 PROBLEM — Z30.432 ENCOUNTER FOR IUD REMOVAL: Status: RESOLVED | Noted: 2020-06-18 | Resolved: 2025-07-22

## 2025-07-22 PROBLEM — Z00.00 ENCOUNTER FOR PREVENTIVE HEALTH EXAMINATION: Status: RESOLVED | Noted: 2025-04-22 | Resolved: 2025-07-22

## 2025-07-22 PROCEDURE — 99213 OFFICE O/P EST LOW 20 MIN: CPT | Performed by: FAMILY MEDICINE

## 2025-07-22 NOTE — PROGRESS NOTES
CC: Hypertension      SUBJECTIVE:  Tory Loya presents as a new patient to discuss hypertension. She had her annual physical with Ximena Bejarano CNM on 4/23/2025.     She had an elevated BP reading at that visit so she started to check her BP at home. All readings at home have been normal. Home readings ranged from 112-125/57-77 - daily for a few weeks then occasionally since then.     She is feeling well and denies any symptoms of headache, blurry vision, ringing or buzzing in ears, chest pain, cough, SOB, palpitations, orthopnea, PND or peripheral edema. There has not been any syncope, presyncope, or symptoms suggestive of TIA or stroke or lower extremity claudication.    Dietary patterns: Good, but could be better  Physical activity: Workout classes 2x/week and walking dog daily   Substance use: No tobacco use, social alcohol use, no drug use     Contraception: none, going to start trying to conceive this fall   Breastfeeding: no    She does get labs every year through her health insurance. She has labs scheduled for next week. Cholesterol and comprehensive metabolic panel. All normal last year.     OBJECTIVE:  Vitals:    07/22/25 1408 07/22/25 1409 07/22/25 1410 07/22/25 1411   BP: 116/79 119/78 124/80 120/79   Pulse:    88   Height:           General: Healthy, alert, and in no distress.  Lungs: CTAB.  Heart: RRR. No murmur.  Extremities: Warm. Well-perfused. No cyanosis or edema.      ASSESSMENT/PLAN:  White coat syndrome without HTN. Home BP readings and readings on AHA BP protocol excellent.   Send lab results from health insurance via [employer] to review after she has these completed next week.   Doesn't need to continue to check BP at home at this point since home readings all excellent.   Worrisome signs and symptoms were discussed with Rafael and she was instructed to return to the clinic for concerning symptoms or to call with questions.    Return if symptoms worsen or fail to  improve.      Colette Fairbanks MD  Family Medicine